# Patient Record
(demographics unavailable — no encounter records)

---

## 2025-03-09 NOTE — HISTORY OF PRESENT ILLNESS
[Disease: _____________________] : Disease: [unfilled] [T: ___] : T[unfilled] [N: ___] : N[unfilled] [M: ___] : M[unfilled] [AJCC Stage: ____] : AJCC Stage: [unfilled] [de-identified] : 64 y/o Female with a history of a right breast 1.2 cm, node positive (), IDC, which was ER 0%, MT 0%, HER2 0+ and amplified by FISH (HER2/CEP17 ratio 2).  She is s/p lumpectomy and SLN biopsy (13).  She consented and enrolled in NSABP B 47 which is a randomized phase 3 trial comparing adjuvant chemotherapy alone (6 cycles of docetaxel plus cyclophosphamide) to chemotherapy plus trastuzumab in women with node positive or high-risk node negative HER-2 low invasive breast cancer.  She completed cytotoxic therapy in 2014. She then underwent radiation therapy and recent 6000 cGy to the right breast from 14 - 9/10/14. She completed 1 year of trastuzumab therapy on 3/17/15.  2021:  Patient recently underwent breast imaging. She underwent left breast excisional biopsy which showed lobular carcinoma in situ, classical type, extensive She is here today to discuss risk reduction.  We discussed that previously she had triple negative breast cancer and is likely still at risk for estrogen negative breast cancer.  Typically endocrine therapy is recommended for 5 years for ER positive breast cancer prevention after the diagnosis of LCIS.  Endocrine therapy will not be helpful in that case as she is likely at risk for ER negative disease.  We will discussed the case in our tumor board for consensus opinion. Patient doesn't like the idea of being on estrogen blocker. She is worried about bone and cardio vasc disease.  She reports her grandma  from breast cant a young age. She is nervous and discussed BL mastectomies with Dr Vidal. At this point she is planning on close surveillance  ADDENDUM:  TB rec to start AI s/e reviewed AI sent  23 - Insomnia but not related to hot flashes; no anxiety  She has explored surgical as well as medical oncology options for cancer prevention given extensive LCIS dx .  We had a great discussion about cancer recurrence after LCIS.  Given previous history of TNBC, one is at a higher risk for second primary triple negative cancer.  Surgical options like bilateral mastectomies were discussed by her surgeon and she saw plastic surgery as well.  Surgery is not recommended at this time. patient agreed to take endocrine therapy to decrease the risk of ER positive breast cancer [de-identified] : IDC, negative genetic testing, triple negative [de-identified] : Ms. JASPREET LINDA  is here for f/u right breast cancer TNBC, completed 1 year Herceptin 3/2015 (on a clinical trial). LCIS dx 10/2021. Started anastrozole 1/2022.   8//2024 Taking anastrazole daily. Denies hot flashes, joint pain.  She denies aches/pain, vag dryness, excessive fatigue, GI s/e, hair loss. She is active, no change in energy, wt or appetite.  no s/e from previously chemo, no lymphedema, no PMB Breast imaging with Dr Brown 1/2025 She has remote h/o smoking. CT chest 12/2024 showed few pulm nodules, plan to repeat in one [place] CT coronary Ca score is 34. Now on lipitor  DEXA 2019 NORMAL, 11/2021 osteopenia. January 2024. Results consistent with Osteopenia (spine: -1.8). repeat dexa in one year to monitor for toxicity closely  If DEXA worsen or cholesterol worsen, will stop AI before 5 yrs  She takes ca+ vit D

## 2025-03-09 NOTE — REASON FOR VISIT
[Follow-Up Visit] : a follow-up [Other: _____] : [unfilled] [FreeTextEntry2] : Breast Cancer ER/KS NEGATIVE, HER-2 low, LCIS

## 2025-03-09 NOTE — PHYSICAL EXAM
[Fully active, able to carry on all pre-disease performance without restriction] : Status 0 - Fully active, able to carry on all pre-disease performance without restriction [Normal] : affect appropriate [de-identified] : the right breast has a well-healed incision around the areola region. There is also a well-healed incision in the R axillary region

## 2025-03-09 NOTE — HISTORY OF PRESENT ILLNESS
[Disease: _____________________] : Disease: [unfilled] [T: ___] : T[unfilled] [N: ___] : N[unfilled] [M: ___] : M[unfilled] [AJCC Stage: ____] : AJCC Stage: [unfilled] [de-identified] : 62 y/o Female with a history of a right breast 1.2 cm, node positive (), IDC, which was ER 0%, MN 0%, HER2 0+ and amplified by FISH (HER2/CEP17 ratio 2).  She is s/p lumpectomy and SLN biopsy (13).  She consented and enrolled in NSABP B 47 which is a randomized phase 3 trial comparing adjuvant chemotherapy alone (6 cycles of docetaxel plus cyclophosphamide) to chemotherapy plus trastuzumab in women with node positive or high-risk node negative HER-2 low invasive breast cancer.  She completed cytotoxic therapy in 2014. She then underwent radiation therapy and recent 6000 cGy to the right breast from 14 - 9/10/14. She completed 1 year of trastuzumab therapy on 3/17/15.  2021:  Patient recently underwent breast imaging. She underwent left breast excisional biopsy which showed lobular carcinoma in situ, classical type, extensive She is here today to discuss risk reduction.  We discussed that previously she had triple negative breast cancer and is likely still at risk for estrogen negative breast cancer.  Typically endocrine therapy is recommended for 5 years for ER positive breast cancer prevention after the diagnosis of LCIS.  Endocrine therapy will not be helpful in that case as she is likely at risk for ER negative disease.  We will discussed the case in our tumor board for consensus opinion. Patient doesn't like the idea of being on estrogen blocker. She is worried about bone and cardio vasc disease.  She reports her grandma  from breast cant a young age. She is nervous and discussed BL mastectomies with Dr Vidal. At this point she is planning on close surveillance  ADDENDUM:  TB rec to start AI s/e reviewed AI sent  23 - Insomnia but not related to hot flashes; no anxiety  She has explored surgical as well as medical oncology options for cancer prevention given extensive LCIS dx .  We had a great discussion about cancer recurrence after LCIS.  Given previous history of TNBC, one is at a higher risk for second primary triple negative cancer.  Surgical options like bilateral mastectomies were discussed by her surgeon and she saw plastic surgery as well.  Surgery is not recommended at this time. patient agreed to take endocrine therapy to decrease the risk of ER positive breast cancer [de-identified] : IDC, negative genetic testing, triple negative [de-identified] : Ms. JASPREET LINDA  is here for f/u right breast cancer TNBC, completed 1 year Herceptin 3/2015 (on a clinical trial). LCIS dx 10/2021. Started anastrozole 1/2022.   8//2024 Taking anastrazole daily. Denies hot flashes, joint pain.  She denies aches/pain, vag dryness, excessive fatigue, GI s/e, hair loss. She is active, no change in energy, wt or appetite.  no s/e from previously chemo, no lymphedema, no PMB Breast imaging with Dr Brown 1/2025 She has remote h/o smoking. CT chest 12/2024 showed few pulm nodules, plan to repeat in one [place] CT coronary Ca score is 34. Now on lipitor  DEXA 2019 NORMAL, 11/2021 osteopenia. January 2024. Results consistent with Osteopenia (spine: -1.8). repeat dexa in one year to monitor for toxicity closely  If DEXA worsen or cholesterol worsen, will stop AI before 5 yrs  She takes ca+ vit D

## 2025-03-09 NOTE — ASSESSMENT
[FreeTextEntry1] : Ms. Linda has h/o stage I TNBC in 2013. She was HER2 2+ and was enrolled in NSABP B47. She completed adjuvant therapy in June 2014.  LCIS dx 10/2021. Started anastrozole 1/2022.  Genetics negative  She has explored surgical as well as medical oncology options for cancer prevention given extensive LCIS dx 2021.  We had a in depth discussion about cancer recurrence after LCIS.  Given previous history of TNBC, she is at a higher risk for second primary triple negative cancer.  Surgical options like bilateral mastectomies were discussed by her surgeon and she saw plastic surgery as well.  Surgery is not recommended at this time. patient agreed to take endocrine therapy to decrease the risk of ER positive breast cancer.  Started anastrozole 1/2022.   1. Breast ca prevention: Ms. JASPREET LINDA  is tolerating AI well, good compliance. She has mild side effects from the treatment. Continue treatment x 5 yrs. Annual breast imaging with Dr. Vidal. She has remote h/o smoking. CT chest 12/2024 showed few pulm nodules, plan to repeat in one [place] CT coronary Ca score is 34. Now on lipitor  DEXA 2019 NORMAL, 11/2021 osteopenia. January 2024. Results consistent with Osteopenia (spine: -1.8). repeat dexa in one year to monitor for toxicity closely  If DEXA worsen or cholesterol worsen, will stop AI before 5 yrs   She has developed borderline cholesterol and osteopenia in the last 2 years since she is on AI. We discussed age related changes as well as s/e of AI. AI has questionable benefit in her case ( cancer prevention with h/o TNBC) but she is motivated to continue AI for now. We will continue to review risks/benefits at each visit  2. Osteopenia: Concern for worsening bone density and fractures due to anastrozole. Rec to  continue calcium and vit D. DEXA 1-2 yrs. 11/2021 DEXA with osteopenia. 1/2024 osteopenia. Was not taking Calcium/vitamin D, now will take. Was worried about calcium causing CAD 3. High cholesterol/CAD risk factors: Concern for worsening cholesterol/CAD risk factors due to anastrozole. Lipid profile annually with pcp. Life style modifications d/w her. Patient continues to be very active.   RV in 6 months

## 2025-03-09 NOTE — PHYSICAL EXAM
[Fully active, able to carry on all pre-disease performance without restriction] : Status 0 - Fully active, able to carry on all pre-disease performance without restriction [Normal] : affect appropriate [de-identified] : the right breast has a well-healed incision around the areola region. There is also a well-healed incision in the R axillary region

## 2025-03-09 NOTE — REASON FOR VISIT
[Follow-Up Visit] : a follow-up [Other: _____] : [unfilled] [FreeTextEntry2] : Breast Cancer ER/GA NEGATIVE, HER-2 low, LCIS